# Patient Record
(demographics unavailable — no encounter records)

---

## 2025-02-06 NOTE — PHYSICAL EXAM
[Normal Thyroid] : the thyroid was normal [Normal Breath Sounds] : Normal breath sounds [Normal Heart Sounds] : normal heart sounds [Abdominal Masses] : No abdominal masses [Abdomen Tenderness] : ~T ~M No abdominal tenderness [No HSM] : no hepatosplenomegaly [No Rash or Lesion] : No rash or lesion [Alert] : alert [Oriented to Person] : oriented to person [Oriented to Place] : oriented to place [Oriented to Time] : oriented to time [Calm] : calm [de-identified] : NAD [de-identified] : Incision sites healing well

## 2025-02-06 NOTE — PLAN
[FreeTextEntry1] : No further surgical intervention necessary at this time.  Patient can follow-up with her primary care provider as needed.

## 2025-02-06 NOTE — ASSESSMENT
[FreeTextEntry1] : 50-year-old woman status post laparoscopic cholecystectomy presenting today for follow-up visit.  Patient has no acute complaints, denies abdominal pain.  Incision sites healing well.

## 2025-02-06 NOTE — HISTORY OF PRESENT ILLNESS
[de-identified] : 50-year-old woman status post laparoscopic cholecystectomy presenting today for follow-up visit.  Patient has no acute complaints, denies abdominal pain.  Incision sites healing well. [de-identified] : 50 year old female with a past medical history of myomectomy secondary to uterine fibroids, asthma, and gallstones, who presented to the ED on complaining of  RUQ pain for one day. Patient states her pain began around 9pm last night, and is a constant stabbing pain. Patient states she has had this pain before, which began 1 year ago. Patient endorses nausea and had one episode of non-bilious and non-bloody emesis. Patient denies any fever, chills, dyspnea, dysuria, changes in bowel habits, or recent sick contacts. Workup in the ED included a RUQ US which revealed cholelithiasis, GB Wall 4.61mm, and CBD 5.11mm. Patient brought to the OR for acute cholecystitis. Intraoperatively, lysis of omental adhesions, laparoscopic decompression of gallbladder, bile sent for culture. critical view of safety achieved. clipped cystic artery x2, clipped cystic duct with 5mm clip applier x2, but used additional heme-o-lock clip to ensure clip was all the way across duct. Post operatively, patient is tolerating diet, pain is controlled. Patient is medically appropriate for discharge.